# Patient Record
Sex: MALE | Race: WHITE | NOT HISPANIC OR LATINO | Employment: FULL TIME | URBAN - METROPOLITAN AREA
[De-identification: names, ages, dates, MRNs, and addresses within clinical notes are randomized per-mention and may not be internally consistent; named-entity substitution may affect disease eponyms.]

---

## 2021-07-04 ENCOUNTER — HOSPITAL ENCOUNTER (EMERGENCY)
Facility: MEDICAL CENTER | Age: 21
End: 2021-07-04
Attending: EMERGENCY MEDICINE
Payer: COMMERCIAL

## 2021-07-04 VITALS
OXYGEN SATURATION: 98 % | SYSTOLIC BLOOD PRESSURE: 127 MMHG | HEIGHT: 70 IN | BODY MASS INDEX: 19.33 KG/M2 | TEMPERATURE: 100.1 F | DIASTOLIC BLOOD PRESSURE: 77 MMHG | RESPIRATION RATE: 20 BRPM | HEART RATE: 74 BPM | WEIGHT: 135 LBS

## 2021-07-04 DIAGNOSIS — T78.40XA ALLERGIC REACTION, INITIAL ENCOUNTER: ICD-10-CM

## 2021-07-04 PROCEDURE — 700111 HCHG RX REV CODE 636 W/ 250 OVERRIDE (IP): Performed by: EMERGENCY MEDICINE

## 2021-07-04 PROCEDURE — 96374 THER/PROPH/DIAG INJ IV PUSH: CPT

## 2021-07-04 PROCEDURE — 99285 EMERGENCY DEPT VISIT HI MDM: CPT

## 2021-07-04 RX ORDER — DEXAMETHASONE SODIUM PHOSPHATE 10 MG/ML
10 INJECTION, SOLUTION INTRAMUSCULAR; INTRAVENOUS ONCE
Status: COMPLETED | OUTPATIENT
Start: 2021-07-04 | End: 2021-07-04

## 2021-07-04 RX ADMIN — FAMOTIDINE 20 MG: 10 INJECTION INTRAVENOUS at 21:45

## 2021-07-04 RX ADMIN — DEXAMETHASONE SODIUM PHOSPHATE 10 MG: 10 INJECTION INTRAMUSCULAR; INTRAVENOUS at 21:45

## 2021-07-05 NOTE — ED PROVIDER NOTES
"ED Provider Note    Scribed for Marty Nieto M.D. by Osvaldo Stover. 7/4/2021,  9:38 PM.    Means of Arrival: Walk-in  History obtained from: Patient  History limited by: None    CHIEF COMPLAINT  Chief Complaint   Patient presents with    Allergic Reaction     pt had a metallic taste in his mouth, and felt like his throat was tight. took 75mg of benadryl PTA. pt had known tree nut allergy, not sure what he ate       HPI  Randy Sneed is a 21 y.o. male who presents to the Emergency Department for evaluation of an allergic reaction onset prior to arrival. He is allergic to tree nuts and is unsure what he ate, but he began experiencing a metallic taste, tongue swelling, shaking, and nausea. He took 75 mg Benadryl prior to arrival with mild alleviation of his symptoms No current shortness of breath, rash, or itching. He did not take any Epinephrine.      REVIEW OF SYSTEMS  HENT: Positive for oral swelling  RESPIRATORY:  No shortness of breath  GASTROINTESTINAL: Positive for nausea  SKIN: No rash or itching.  See [facility] for further details.   All other systems are negative.     PAST MEDICAL HISTORY  Past Medical History:   Diagnosis Date    Asthma        FAMILY HISTORY  No family history pertinent.    SOCIAL HISTORY       SURGICAL HISTORY  No past surgical history noted.    CURRENT MEDICATIONS  No current outpatient medications    ALLERGIES  Allergies   Allergen Reactions    Tree Nuts Food Allergy        PHYSICAL EXAM  VITAL SIGNS: /95   Pulse (!) 102   Resp 15   Ht 1.778 m (5' 10\")   Wt 61.2 kg (135 lb)   SpO2 99%   BMI 19.37 kg/m²    Gen: Alert, no acute distress  HEENT: ATNC, normal oropharynx, no swelling  Eyes: PERRL, EOMI, normal conjunctiva.   Neck: trachea midline  Resp: no respiratory distress, clear to auscultation bilaterally, no wheezes, rales, or crackles  CV: No JVD, regular rate and rhythm, no murmurs, gallops, or rubs  Abd: Soft, non-tender, non-distended  Ext: No deformities  Skin: No " rash  Psych: normal mood  Neuro: speech fluent     COURSE & MEDICAL DECISION MAKING  Pertinent Labs & Imaging studies reviewed. (See chart for details)    9:38 PM Patient seen and examined at bedside. Patient will be treated with Decadron and Pepcid for his symptoms.     10:55 PM Patient was reevaluated at bedside. He is feeling much better at this time. Informed him that he will be prescribed an Epi-Pen. Discussed discharge instructions and return precautions with the patient and they were cleared for discharge. Patient was given the opportunity to ask any further questions. He is comfortable with discharge at this time.      PPE Note: I verified that the patient was wearing a mask and I was wearing appropriate PPE every time I entered the room. The patient's mask was on the patient at all times during my encounter except for a brief view of the oropharynx.     Scribe remained outside the patient's room and did not have any contact with the patient for the duration of patient encounter.     Medical Decision Making:  Patient presents with concern for allergic reaction.  He has no hives, no evidence of objective oral swelling, no stridor or hives, no voice changes.  At this point, unclear whether this is true allergic reaction.  We discussed with the patient and will hold epinephrine for the moment and monitor him.  He was treated with allergy medications including steroids, antihistamines.  He was observed in the emergency department with no worsening of symptoms.  Patient discharged home advised to follow-up with his regular doctor.  He was given return precautions, anticipatory guidance.    The patient will return for new or worsening symptoms and is stable at the time of discharge.    The patient is referred to a primary physician for blood pressure management, diabetic screening, and for all other preventative health concerns.    DISPOSITION:  Patient will be discharged home in stable condition.    FOLLOW  UP:  Your regular doctor.  To establish a primary care provider within our system, please call [phone]          [facility], Emergency Dept  [address] [place] [address] [phone]    If symptoms worsen      OUTPATIENT MEDICATIONS:  Discharge Medication List as of 7/4/2021 10:56 PM        START taking these medications    Details   EPINEPHrine 0.3 MG/0.3ML Solution Prefilled Syringe Inject 0.3 mL into the shoulder, thigh, or buttocks one time as needed (for anaphylaxis) for up to 2 doses., Disp-2 Each, R-0, Print Rx Paper             FINAL IMPRESSION  1. Allergic reaction, initial encounter            I, Osvaldo Stover (Scribe), am scribing for, and in the presence of, Marty Nieto M.D..    Electronically signed by: Osvaldo Stover (Danielibe), 7/4/2021    IMarty M.D. personally performed the services described in this documentation, as scribed by Osvaldo Stover in my presence, and it is both accurate and complete.    The note accurately reflects work and decisions made by me.  Marty Nieto M.D.  7/5/2021  4:38 AM

## 2021-07-05 NOTE — ED TRIAGE NOTES
Chief Complaint   Patient presents with   • Allergic Reaction     pt had a metallic taste in his mouth, and felt like his throat was tight. took 75mg of benadryl PTA. pt had known tree nut allergy, not sure what he ate     Pt brought back from triage to [facility]. ERP and pharmcist at the bedside. IV placed.     /95   Pulse (!) 102   Resp 15   SpO2 99%

## 2021-07-05 NOTE — DISCHARGE INSTRUCTIONS
You were seen in the emergency department for an allergic reaction. The cause of your allergies is not clear, and you should follow up with an allergist.  You may take Benadryl or Zyrtec for hives as well as Pepcid.  You have been given a dose of a steroid that should last for the next several days to prevent recurrence.  You're being given a prescription for EpiPen. This should be carried with you and injected into your thigh if you develop inability to breathe or feel like you're going to pass out after an allergic reaction.    Please return to emergency department if you develop difficulty breathing, lightheadedness, vomiting, or other concerning symptoms.

## 2021-07-05 NOTE — ED NOTES
Pt discharged home. IV removed. AVS reviewed and understood, all questions answered. Paper prescription given to pt